# Patient Record
Sex: MALE | Race: WHITE | NOT HISPANIC OR LATINO | URBAN - METROPOLITAN AREA
[De-identification: names, ages, dates, MRNs, and addresses within clinical notes are randomized per-mention and may not be internally consistent; named-entity substitution may affect disease eponyms.]

---

## 2019-02-21 ENCOUNTER — OUTPATIENT (OUTPATIENT)
Dept: OUTPATIENT SERVICES | Facility: HOSPITAL | Age: 59
LOS: 1 days | Discharge: HOME | End: 2019-02-21

## 2019-02-21 DIAGNOSIS — N40.0 BENIGN PROSTATIC HYPERPLASIA WITHOUT LOWER URINARY TRACT SYMPTOMS: ICD-10-CM

## 2019-02-21 DIAGNOSIS — D64.9 ANEMIA, UNSPECIFIED: ICD-10-CM

## 2019-02-21 DIAGNOSIS — E78.00 PURE HYPERCHOLESTEROLEMIA, UNSPECIFIED: ICD-10-CM

## 2019-02-21 DIAGNOSIS — K76.89 OTHER SPECIFIED DISEASES OF LIVER: ICD-10-CM

## 2019-02-21 DIAGNOSIS — E00.9 CONGENITAL IODINE-DEFICIENCY SYNDROME, UNSPECIFIED: ICD-10-CM

## 2019-10-12 ENCOUNTER — OUTPATIENT (OUTPATIENT)
Dept: OUTPATIENT SERVICES | Facility: HOSPITAL | Age: 59
LOS: 1 days | Discharge: HOME | End: 2019-10-12

## 2019-10-12 DIAGNOSIS — E29.1 TESTICULAR HYPOFUNCTION: ICD-10-CM

## 2019-10-12 DIAGNOSIS — N52.9 MALE ERECTILE DYSFUNCTION, UNSPECIFIED: ICD-10-CM

## 2019-10-12 DIAGNOSIS — N50.3 CYST OF EPIDIDYMIS: ICD-10-CM

## 2019-11-21 ENCOUNTER — OUTPATIENT (OUTPATIENT)
Dept: OUTPATIENT SERVICES | Facility: HOSPITAL | Age: 59
LOS: 1 days | Discharge: HOME | End: 2019-11-21

## 2019-11-21 DIAGNOSIS — I73.89 OTHER SPECIFIED PERIPHERAL VASCULAR DISEASES: ICD-10-CM

## 2019-11-21 DIAGNOSIS — E29.1 TESTICULAR HYPOFUNCTION: ICD-10-CM

## 2019-11-21 DIAGNOSIS — N52.9 MALE ERECTILE DYSFUNCTION, UNSPECIFIED: ICD-10-CM

## 2020-08-19 PROBLEM — Z00.00 ENCOUNTER FOR PREVENTIVE HEALTH EXAMINATION: Status: ACTIVE | Noted: 2020-08-19

## 2020-08-20 ENCOUNTER — APPOINTMENT (OUTPATIENT)
Dept: OTOLARYNGOLOGY | Facility: CLINIC | Age: 60
End: 2020-08-20
Payer: COMMERCIAL

## 2020-08-20 VITALS
HEIGHT: 73 IN | SYSTOLIC BLOOD PRESSURE: 114 MMHG | WEIGHT: 227 LBS | BODY MASS INDEX: 30.09 KG/M2 | DIASTOLIC BLOOD PRESSURE: 78 MMHG

## 2020-08-20 DIAGNOSIS — Z82.49 FAMILY HISTORY OF ISCHEMIC HEART DISEASE AND OTHER DISEASES OF THE CIRCULATORY SYSTEM: ICD-10-CM

## 2020-08-20 DIAGNOSIS — Z78.9 OTHER SPECIFIED HEALTH STATUS: ICD-10-CM

## 2020-08-20 PROCEDURE — 31575 DIAGNOSTIC LARYNGOSCOPY: CPT

## 2020-08-20 PROCEDURE — 99204 OFFICE O/P NEW MOD 45 MIN: CPT | Mod: 25

## 2020-08-20 RX ORDER — ROSUVASTATIN CALCIUM 5 MG/1
TABLET, FILM COATED ORAL
Refills: 0 | Status: ACTIVE | COMMUNITY

## 2020-08-20 RX ORDER — AZELASTINE HYDROCHLORIDE 137 UG/1
SPRAY, METERED NASAL
Refills: 0 | Status: ACTIVE | COMMUNITY

## 2020-08-20 RX ORDER — ALBUTEROL 90 MCG
AEROSOL (GRAM) INHALATION
Refills: 0 | Status: ACTIVE | COMMUNITY

## 2020-08-20 RX ORDER — BUDESONIDE 1 MG/2ML
INHALANT ORAL
Refills: 0 | Status: ACTIVE | COMMUNITY

## 2020-08-20 NOTE — CONSULT LETTER
[Dear  ___] : Dear  [unfilled], [Please see my note below.] : Please see my note below. [Consult Letter:] : I had the pleasure of evaluating your patient, [unfilled]. [Consult Closing:] : Thank you very much for allowing me to participate in the care of this patient.  If you have any questions, please do not hesitate to contact me. [Sincerely,] : Sincerely, [FreeTextEntry2] : Adin Gr MD [FreeTextEntry3] : Kristy Farrell MD\par Otolaryngology - Head & Neck Surgery\par

## 2020-08-20 NOTE — REVIEW OF SYSTEMS
[As Noted in HPI] : as noted in HPI [Patient Intake Form Reviewed] : Patient intake form was reviewed [Negative] : Heme/Lymph [FreeTextEntry1] : all other ros negative

## 2020-08-20 NOTE — HISTORY OF PRESENT ILLNESS
[de-identified] : Patient presents today with c/o persistent cough. Patient states has been present since March 2020. Nonproductive cough. He has some wheezing at night. Cough is worse at night and in the morning when waking up. Currently on Azelastine and inhaler with no improvement. He has been on nebulizer and Spiriva. He was treated for post nasal drip with Zyrtec D and Singulair - no improvement. No dysphagia. No choking. No h/o smoking. Cigar smoking on occasion but he has cut back for the past few months.  Dry nonpproductive cough. He has occasional reflux, he will take tums prn. He had 2 negative CXRs and 2 negative covid antibody tests. She has been seen by Dr Burt, pulmonologist, now seeing Dr Cohn, who diagnosed him with mild asthma. Now on Azelastine and Budesonide, no major changes to his cough. No URI at the time of onset. Denies changes to sense of smell. +frequent throat clearing, occasional heartburn, no globus, no sore throat. \par Had hay fever as a child, eye swelling, "allergy shots" as a child, once a year. Now not much allergy symptoms.

## 2020-10-22 ENCOUNTER — APPOINTMENT (OUTPATIENT)
Dept: OTOLARYNGOLOGY | Facility: CLINIC | Age: 60
End: 2020-10-22
Payer: COMMERCIAL

## 2020-10-22 VITALS — HEIGHT: 73 IN | BODY MASS INDEX: 30.09 KG/M2 | WEIGHT: 227 LBS

## 2020-10-22 PROCEDURE — 99212 OFFICE O/P EST SF 10 MIN: CPT | Mod: 25

## 2020-10-22 PROCEDURE — 31575 DIAGNOSTIC LARYNGOSCOPY: CPT

## 2020-10-22 NOTE — HISTORY OF PRESENT ILLNESS
[de-identified] : \par  [FreeTextEntry1] : 10/22/2020 Patient presents today with c/o persistent cough. Patient states cough is much better with no concerns. Has been taking pantoprazole daily. After 1 day of taking medication patient feels completely resolved.

## 2020-10-22 NOTE — PROCEDURE
[Flexible Endoscope] : examined with the flexible endoscope [Normal] : posterior cricoid area had healthy pink mucosa in the interarytenoid area and the esophageal inlet

## 2021-11-10 ENCOUNTER — APPOINTMENT (OUTPATIENT)
Dept: OTOLARYNGOLOGY | Facility: CLINIC | Age: 61
End: 2021-11-10
Payer: COMMERCIAL

## 2021-11-10 DIAGNOSIS — K21.9 GASTRO-ESOPHAGEAL REFLUX DISEASE W/OUT ESOPHAGITIS: ICD-10-CM

## 2021-11-10 PROCEDURE — 99213 OFFICE O/P EST LOW 20 MIN: CPT | Mod: 25

## 2021-11-10 PROCEDURE — 31575 DIAGNOSTIC LARYNGOSCOPY: CPT

## 2021-11-10 RX ORDER — PANTOPRAZOLE 40 MG/1
40 TABLET, DELAYED RELEASE ORAL
Qty: 90 | Refills: 3 | Status: ACTIVE | COMMUNITY
Start: 2020-08-20 | End: 1900-01-01

## 2021-11-10 NOTE — REASON FOR VISIT
[Subsequent Evaluation] : a subsequent evaluation for [FreeTextEntry2] : acid reflux, throat clearing

## 2021-11-10 NOTE — PROCEDURE
[None] : none [Flexible Endoscope] : examined with the flexible endoscope [Normal] : posterior cricoid area had healthy pink mucosa in the interarytenoid area and the esophageal inlet [True Vocal Cords Erythematous] : bilateral true vocal cord edema [Glottis Arytenoid Cartilages Erythema] : bilateral arytenoid ~M erythema [Arytenoid Erythema ___ /4] : arytenoid erythema [unfilled]U/4

## 2021-11-10 NOTE — HISTORY OF PRESENT ILLNESS
[FreeTextEntry1] : Patient following  up on acid reflux. He admits throat clearing in the morning. Requesting refill on Pantoprazole.

## 2021-11-15 RX ORDER — FAMOTIDINE 40 MG/1
40 TABLET, FILM COATED ORAL
Qty: 90 | Refills: 3 | Status: ACTIVE | COMMUNITY
Start: 2021-11-10 | End: 1900-01-01

## 2022-01-04 ENCOUNTER — APPOINTMENT (OUTPATIENT)
Dept: OTOLARYNGOLOGY | Facility: CLINIC | Age: 62
End: 2022-01-04
Payer: COMMERCIAL

## 2022-01-04 DIAGNOSIS — K21.9 GASTRO-ESOPHAGEAL REFLUX DISEASE W/OUT ESOPHAGITIS: ICD-10-CM

## 2022-01-04 DIAGNOSIS — R05.3 CHRONIC COUGH: ICD-10-CM

## 2022-01-04 DIAGNOSIS — K29.70 GASTRITIS, UNSPECIFIED, W/OUT BLEEDING: ICD-10-CM

## 2022-01-04 PROCEDURE — 31575 DIAGNOSTIC LARYNGOSCOPY: CPT

## 2022-01-04 PROCEDURE — 99213 OFFICE O/P EST LOW 20 MIN: CPT | Mod: 25

## 2022-01-04 NOTE — HISTORY OF PRESENT ILLNESS
[FreeTextEntry1] : Patient returns today following up on acid reflux .  Here to follow up endoscopy  results, has hiatal hernia . Negative for polyps .   Denies any  recent throat irritation .  Pt is taking PPI am and H2 in the evening. Pt is improved overall. Pt has been trying to abide by diet. Pt has improvement in symptoms.

## 2022-10-19 ENCOUNTER — APPOINTMENT (OUTPATIENT)
Dept: ORTHOPEDIC SURGERY | Facility: CLINIC | Age: 62
End: 2022-10-19

## 2022-10-19 PROCEDURE — 99203 OFFICE O/P NEW LOW 30 MIN: CPT | Mod: 25

## 2022-10-19 PROCEDURE — 20550 NJX 1 TENDON SHEATH/LIGAMENT: CPT

## 2022-10-19 NOTE — ASSESSMENT
[FreeTextEntry1] : Patient comes in with complaints of right thumb pain.  Says it has been going on for some time.  It has been going on since August.  He says that it bothers him.  It locks.  He does not have other complaints.\par \par R hand: \par Mild swelling \par Tender 1st A1 pulley \par Decreased thumb ROM \par +thumb triggering\par \par My impression is that this patient has stenosing tenosynovitis of the finger, more commonly known as a trigger finger.  I recommended that the patient undergo a trigger finger injection. The risks, benefits, and alternatives were discussed with the patient in detail. This included (but was not limited to) pain, infection, subcutaneous atrophy, skin depigmentation, elevated glucose etc... The patient agreed to undergo a steroid injection. Under informed consent and sterile conditions, 0.1 cc of 1% plain lidocaine  and 0.9 cc of dexamethasone was precisely injected into the patient's finger. A sterile Band-Aid was placed.  It is my hope that this significantly alleviates the patient's symptoms.\par \par   Patient opted for injection of the right thumb today.  He tolerated the injection well.  He will follow up back in a month.  If he is doing well in a month he will cancel.

## 2022-11-22 ENCOUNTER — APPOINTMENT (OUTPATIENT)
Dept: ORTHOPEDIC SURGERY | Facility: CLINIC | Age: 62
End: 2022-11-22

## 2022-11-22 PROCEDURE — 20550 NJX 1 TENDON SHEATH/LIGAMENT: CPT | Mod: F5

## 2022-11-22 PROCEDURE — 99213 OFFICE O/P EST LOW 20 MIN: CPT | Mod: 25

## 2022-11-22 NOTE — ASSESSMENT
[FreeTextEntry1] : Patient comes in for follow-up of his trigger thumb.  He says it is a lot better but he still has some clicking.  He does not have other complaints today\par \par R hand: \par Mild swelling \par Tender 1st A1 pulley \par Decreased thumb ROM \par +thumb triggering\par \par My impression is that this patient has stenosing tenosynovitis of the finger, more commonly known as a trigger finger.  I recommended that the patient undergo a trigger finger injection. The risks, benefits, and alternatives were discussed with the patient in detail. This included (but was not limited to) pain, infection, subcutaneous atrophy, skin depigmentation, elevated glucose etc... The patient agreed to undergo a steroid injection. Under informed consent and sterile conditions, 0.1 cc of 1% plain lidocaine  and 0.9 cc of dexamethasone was precisely injected into the patient's finger. A sterile Band-Aid was placed.  It is my hope that this significantly alleviates the patient's symptoms.\par \par   Patient opted for   A 2nd injection of the right thumb today.  He tolerated the injection well.  He will follow up back in a month.  If he is doing well in a month he will cancel.

## 2022-12-23 ENCOUNTER — APPOINTMENT (OUTPATIENT)
Dept: ORTHOPEDIC SURGERY | Facility: CLINIC | Age: 62
End: 2022-12-23

## 2022-12-23 DIAGNOSIS — M65.311 TRIGGER THUMB, RIGHT THUMB: ICD-10-CM

## 2022-12-23 PROCEDURE — 99213 OFFICE O/P EST LOW 20 MIN: CPT | Mod: 25

## 2022-12-23 PROCEDURE — 20550 NJX 1 TENDON SHEATH/LIGAMENT: CPT | Mod: F5

## 2022-12-23 NOTE — ASSESSMENT
[FreeTextEntry1] : Patient comes in for follow-up of his trigger thumb.  He says it is a lot better but he still has some clicking.  He does not have other complaints today\par \par R hand: \par Mild swelling \par Tender 1st A1 pulley \par Decreased thumb ROM \par +thumb triggering\par \par My impression is that this patient has stenosing tenosynovitis of the finger, more commonly known as a trigger finger.  I recommended that the patient undergo a trigger finger injection. The risks, benefits, and alternatives were discussed with the patient in detail. This included (but was not limited to) pain, infection, subcutaneous atrophy, skin depigmentation, elevated glucose etc... The patient agreed to undergo a steroid injection. Under informed consent and sterile conditions, 0.1 cc of 1% plain lidocaine  and 0.9 cc of dexamethasone was precisely injected into the patient's finger. A sterile Band-Aid was placed.  It is my hope that this significantly alleviates the patient's symptoms.\par \par   Patient opted for   A 3rd injection of the right thumb today.  He tolerated the injection well.  Patient is going to [place] for the winter.  He will follow up in April if he needs to.

## 2024-07-21 ENCOUNTER — NON-APPOINTMENT (OUTPATIENT)
Age: 64
End: 2024-07-21

## 2025-05-29 ENCOUNTER — APPOINTMENT (OUTPATIENT)
Dept: OTOLARYNGOLOGY | Facility: CLINIC | Age: 65
End: 2025-05-29
Payer: COMMERCIAL

## 2025-05-29 VITALS — WEIGHT: 235 LBS | BODY MASS INDEX: 31.14 KG/M2 | HEIGHT: 73 IN

## 2025-05-29 DIAGNOSIS — H93.8X3 OTHER SPECIFIED DISORDERS OF EAR, BILATERAL: ICD-10-CM

## 2025-05-29 DIAGNOSIS — H69.90 UNSPECIFIED EUSTACHIAN TUBE DISORDER, UNSPECIFIED EAR: ICD-10-CM

## 2025-05-29 PROCEDURE — 92557 COMPREHENSIVE HEARING TEST: CPT

## 2025-05-29 PROCEDURE — 99204 OFFICE O/P NEW MOD 45 MIN: CPT

## 2025-05-29 PROCEDURE — 92567 TYMPANOMETRY: CPT

## 2025-05-29 RX ORDER — ROSUVASTATIN CALCIUM 10 MG/1
10 TABLET, FILM COATED ORAL
Refills: 0 | Status: ACTIVE | COMMUNITY

## 2025-05-29 RX ORDER — LEVOCETIRIZINE DIHYDROCHLORIDE 5 MG/1
5 TABLET ORAL
Qty: 30 | Refills: 3 | Status: ACTIVE | COMMUNITY
Start: 2025-05-29 | End: 1900-01-01

## 2025-05-29 RX ORDER — METHYLPREDNISOLONE 4 MG/1
4 TABLET ORAL
Qty: 1 | Refills: 0 | Status: ACTIVE | COMMUNITY
Start: 2025-05-29 | End: 1900-01-01

## 2025-09-16 ENCOUNTER — APPOINTMENT (OUTPATIENT)
Dept: OTOLARYNGOLOGY | Facility: CLINIC | Age: 65
End: 2025-09-16
Payer: COMMERCIAL

## 2025-09-16 DIAGNOSIS — H93.11 TINNITUS, RIGHT EAR: ICD-10-CM

## 2025-09-16 PROCEDURE — 92550 TYMPANOMETRY & REFLEX THRESH: CPT | Mod: 52

## 2025-09-16 PROCEDURE — 99213 OFFICE O/P EST LOW 20 MIN: CPT

## 2025-09-16 PROCEDURE — 92557 COMPREHENSIVE HEARING TEST: CPT

## 2025-09-16 RX ORDER — METHYLPREDNISOLONE 4 MG/1
4 TABLET ORAL
Qty: 1 | Refills: 0 | Status: ACTIVE | COMMUNITY
Start: 2025-09-16 | End: 1900-01-01